# Patient Record
Sex: MALE | Race: WHITE | NOT HISPANIC OR LATINO | URBAN - METROPOLITAN AREA
[De-identification: names, ages, dates, MRNs, and addresses within clinical notes are randomized per-mention and may not be internally consistent; named-entity substitution may affect disease eponyms.]

---

## 2023-09-11 PROBLEM — F32.9 MAJOR DEPRESSIVE DISORDER, SINGLE EPISODE, UNSPECIFIED: Status: ACTIVE | Noted: 2023-09-11

## 2023-09-11 PROBLEM — Q22.1 PULMONIC STENOSIS, CONGENITAL (HHS-HCC): Status: ACTIVE | Noted: 2023-09-11

## 2023-09-13 ENCOUNTER — HOSPITAL ENCOUNTER (OUTPATIENT)
Dept: DATA CONVERSION | Facility: HOSPITAL | Age: 28
Discharge: PSYCHIATRIC OR SUBSTANCE USE FACILITY | End: 2023-09-13
Payer: COMMERCIAL

## 2023-09-13 DIAGNOSIS — Z20.822 CONTACT WITH AND (SUSPECTED) EXPOSURE TO COVID-19: ICD-10-CM

## 2023-09-13 DIAGNOSIS — F41.9 ANXIETY DISORDER, UNSPECIFIED: ICD-10-CM

## 2023-09-13 DIAGNOSIS — F32.A DEPRESSION, UNSPECIFIED: ICD-10-CM

## 2023-09-13 DIAGNOSIS — R45.851 SUICIDAL IDEATIONS: ICD-10-CM

## 2023-09-13 LAB
ALBUMIN SERPL-MCNC: 4.5 GM/DL (ref 3.5–5)
ALBUMIN/GLOB SERPL: 1.6 RATIO (ref 1.5–3)
ALP BLD-CCNC: 55 U/L (ref 35–125)
ALT SERPL-CCNC: 21 U/L (ref 5–40)
AMPHETAMINES UR QL SCN>1000 NG/ML: NEGATIVE
ANION GAP SERPL CALCULATED.3IONS-SCNC: 13 MMOL/L (ref 0–19)
AST SERPL-CCNC: 25 U/L (ref 5–40)
BACTERIA UR QL AUTO: NEGATIVE
BARBITURATES UR QL SCN>300 NG/ML: NEGATIVE
BASOPHILS # BLD AUTO: 0.04 K/UL (ref 0–0.22)
BASOPHILS NFR BLD AUTO: 0.4 % (ref 0–1)
BENZODIAZ UR QL SCN>300 NG/ML: NEGATIVE
BILIRUB SERPL-MCNC: 0.5 MG/DL (ref 0.1–1.2)
BILIRUB UR QL STRIP.AUTO: NEGATIVE
BUN SERPL-MCNC: 11 MG/DL (ref 8–25)
BUN/CREAT SERPL: 12.2 RATIO (ref 8–21)
BZE UR QL SCN>300 NG/ML: NEGATIVE
CALCIUM SERPL-MCNC: 9.2 MG/DL (ref 8.5–10.4)
CANNABINOIDS UR QL SCN>50 NG/ML: NEGATIVE
CHLORIDE SERPL-SCNC: 101 MMOL/L (ref 97–107)
CLARITY UR: CLEAR
CO2 SERPL-SCNC: 24 MMOL/L (ref 24–31)
COLOR UR: ABNORMAL
CREAT SERPL-MCNC: 0.9 MG/DL (ref 0.4–1.6)
DEPRECATED RDW RBC AUTO: 36.7 FL (ref 37–54)
DIFFERENTIAL METHOD BLD: ABNORMAL
DRUG SCREEN COMMENT UR-IMP: NORMAL
EOSINOPHIL # BLD AUTO: 0 K/UL (ref 0–0.45)
EOSINOPHIL NFR BLD: 0 % (ref 0–3)
ERYTHROCYTE [DISTWIDTH] IN BLOOD BY AUTOMATED COUNT: 11.3 % (ref 11.7–15)
ETHANOL SERPL-MCNC: <0.01 GM/DL (ref 0–0.01)
FENTANYL+NORFENTANYL UR QL SCN: NORMAL
GFR SERPL CREATININE-BSD FRML MDRD: 120 ML/MIN/1.73 M2
GLOBULIN SER-MCNC: 2.9 G/DL (ref 1.9–3.7)
GLUCOSE SERPL-MCNC: 122 MG/DL (ref 65–99)
GLUCOSE UR STRIP.AUTO-MCNC: NEGATIVE MG/DL
HCT VFR BLD AUTO: 43.6 % (ref 41–50)
HGB BLD-MCNC: 15.4 GM/DL (ref 13.5–16.5)
HGB UR QL STRIP.AUTO: 4 /HPF (ref 0–3)
HGB UR QL: NEGATIVE
HYALINE CASTS UR QL AUTO: 12 /LPF
IMM GRANULOCYTES # BLD AUTO: 0.02 K/UL (ref 0–0.1)
KETONES UR QL STRIP.AUTO: ABNORMAL
LEUKOCYTE ESTERASE UR QL STRIP.AUTO: NEGATIVE
LYMPHOCYTES # BLD AUTO: 1.27 K/UL (ref 1.2–3.2)
LYMPHOCYTES NFR BLD MANUAL: 14.3 % (ref 20–40)
MCH RBC QN AUTO: 31 PG (ref 26–34)
MCHC RBC AUTO-ENTMCNC: 35.3 % (ref 31–37)
MCV RBC AUTO: 87.7 FL (ref 80–100)
METHADONE UR QL SCN>300 NG/ML: NEGATIVE
MICROSCOPIC (UA): ABNORMAL
MONOCYTES # BLD AUTO: 0.86 K/UL (ref 0–0.8)
MONOCYTES NFR BLD MANUAL: 9.7 % (ref 0–8)
NEUTROPHILS # BLD AUTO: 6.7 K/UL
NEUTROPHILS # BLD AUTO: 6.7 K/UL (ref 1.8–7.7)
NEUTROPHILS.IMMATURE NFR BLD: 0.2 % (ref 0–1)
NEUTS SEG NFR BLD: 75.4 % (ref 50–70)
NITRITE UR QL STRIP.AUTO: NEGATIVE
NOTE (COV): NORMAL
NRBC BLD-RTO: 0 /100 WBC
OPIATES UR QL SCN>300 NG/ML: NEGATIVE
OXYCODONE UR QL: NEGATIVE
PCP UR QL SCN>25 NG/ML: NEGATIVE
PH UR STRIP.AUTO: 6 [PH] (ref 4.6–8)
PLATELET # BLD AUTO: 271 K/UL (ref 150–450)
PMV BLD AUTO: 9.4 CU (ref 7–12.6)
POTASSIUM SERPL-SCNC: 3.7 MMOL/L (ref 3.4–5.1)
PROT SERPL-MCNC: 7.4 G/DL (ref 5.9–7.9)
PROT UR STRIP.AUTO-MCNC: ABNORMAL MG/DL
RBC # BLD AUTO: 4.97 M/UL (ref 4.5–5.5)
SARS-COV-2 RNA RESP QL NAA+PROBE: NEGATIVE
SODIUM SERPL-SCNC: 138 MMOL/L (ref 133–145)
SP GR UR STRIP.AUTO: 1.02 (ref 1–1.03)
SPECIMEN SOURCE (COV): NORMAL
SQUAMOUS UR QL AUTO: ABNORMAL /HPF
URINE CULTURE: ABNORMAL
UROBILINOGEN UR QL STRIP.AUTO: NORMAL MG/DL (ref 0–1)
WBC # BLD AUTO: 8.9 K/UL (ref 4.5–11)
WBC #/AREA URNS AUTO: 8 /HPF (ref 0–3)

## 2024-08-01 ASSESSMENT — ENCOUNTER SYMPTOMS
APPETITE CHANGE: 0
HEADACHES: 0
SHORTNESS OF BREATH: 0
COUGH: 0
ABDOMINAL PAIN: 0
VOMITING: 0
CHILLS: 0
DIARRHEA: 0
FEVER: 0
NAUSEA: 0

## 2024-08-01 NOTE — PROGRESS NOTES
[facility]: [facility]  PHYSICAL EXAM      Jose Retana is a 29 y.o. male that is presenting today for cpe.    Assessment/Plan   Diagnoses and all orders for this visit:  Annual physical exam  -     CBC and Auto Differential; Future  -     Comprehensive Metabolic Panel; Future  -     Lipid Panel; Future  -     Hemoglobin A1C; Future  -     Vitamin D 25-Hydroxy,Total (for eval of Vitamin D levels); Future  -     TSH with reflex to Free T4 if abnormal; Future  Pulmonic stenosis, congenital ([facility]-HCC)  Major depressive disorder with single episode, remission status unspecified  Mixed hyperlipidemia  -     Lipid Panel; Future  Hyperglycemia  -     Hemoglobin A1C; Future  Vitamin D deficiency  -     Vitamin D 25-Hydroxy,Total (for eval of Vitamin D levels); Future  Other orders  -     Follow Up In Primary Care - Health Maintenance; Future    TV repair November 2021.  Sees cardiology at [facility].  Seems to be doing well.  Gets the echocardiogram updated yearly.  Was struggling with some depression initially, has been stable on the Prozac.  Main issue is that the weight has been going up.  Could be in part due to the SSRI.  Talked about diet lifestyle efforts.  Talked about getting a pedometer or a smart watch and trying to get his steps in every day.  His dietary habits have not really changed.  Update the laboratory studies.    Subjective   HPI  The patient is here for physical exam and follow up.  We reviewed the medical, family and social history as outlined below.  We reviewed any currently prescribed medications.  We reviewed the screening and prevention schedule in detail.    The patient denies chest pain and shortness of breath.  No exertion-provoked or anginal-type symptoms are reported.    Review of Systems   Constitutional:  Negative for appetite change, chills and fever.   Respiratory:  Negative for cough and shortness of breath.    Cardiovascular:  Negative for chest pain.  "  Gastrointestinal:  Negative for abdominal pain, diarrhea, nausea and vomiting.   Neurological:  Negative for headaches.   All other systems reviewed and are negative.     Objective   Vitals:    08/05/24 1125   BP: 116/74   Pulse: 84   Temp: 36.5 °C (97.7 °F)   SpO2: 97%     Body mass index is 28.35 kg/m².  Physical Exam  Vitals reviewed.   Constitutional:       General: He is not in acute distress.     Appearance: He is not toxic-appearing.   HENT:      Head: Normocephalic and atraumatic.      Mouth/Throat:      Mouth: Mucous membranes are moist.   Eyes:      Pupils: Pupils are equal, round, and reactive to light.   Cardiovascular:      Rate and Rhythm: Normal rate and regular rhythm.      Heart sounds: No murmur heard.  Pulmonary:      Breath sounds: Normal breath sounds. No wheezing, rhonchi or rales.   Abdominal:      General: There is no distension.      Palpations: Abdomen is soft.      Hernia: There is no hernia in the left inguinal area or right inguinal area.   Genitourinary:     Penis: Normal and circumcised.       Testes: Normal.         Right: Mass not present.         Left: Mass not present.   Musculoskeletal:      Right lower leg: No edema.      Left lower leg: No edema.   Neurological:      General: No focal deficit present.      Mental Status: He is alert and oriented to person, place, and time.       Diagnostic Results   Lab Results   Component Value Date    GLUCOSE 122 (H) 09/13/2023    CALCIUM 9.2 09/13/2023     09/13/2023    K 3.7 09/13/2023    CO2 24 09/13/2023     09/13/2023    BUN 11 09/13/2023    CREATININE 0.9 09/13/2023     Lab Results   Component Value Date    ALT 21 09/13/2023    AST 25 09/13/2023    ALKPHOS 55 09/13/2023    BILITOT 0.5 09/13/2023     Lab Results   Component Value Date    WBC 8.9 09/13/2023    HGB 15.4 09/13/2023    HCT 43.6 09/13/2023    MCV 87.7 09/13/2023     09/13/2023     No results found for: \"CHOL\"  No results found for: \"HDL\"  No results found " "for: \"LDLCALC\"  No results found for: \"TRIG\"  No components found for: \"CHOLHDL\"  No results found for: \"HGBA1C\"  Other labs not included in the list above were reviewed either before or during this encounter.    History   History reviewed. No pertinent past medical history.  History reviewed. No pertinent surgical history.  Family History   Problem Relation Name Age of Onset    Cancer Father      Testicular cancer Father          40's    Asthma Mother's Brother      Arthritis Maternal Grandmother      Breast cancer Maternal Grandmother      Cancer Maternal Grandmother      Cancer Maternal Grandfather      Lung cancer Maternal Grandfather      Testicular cancer Other Uncles         40's     Social History     Socioeconomic History    Marital status: Single     Spouse name: Not on file    Number of children: Not on file    Years of education: Not on file    Highest education level: Not on file   Occupational History    Not on file   Tobacco Use    Smoking status: Never    Smokeless tobacco: Never   Substance and Sexual Activity    Alcohol use: Never    Drug use: Never    Sexual activity: Not on file   Other Topics Concern    Not on file   Social History Narrative    Not on file     Social Determinants of Health     Financial Resource Strain: Not on file   Food Insecurity: Not on file   Transportation Needs: Not on file   Physical Activity: Not on file   Stress: Not on file   Social Connections: Not on file   Intimate Partner Violence: Not on file   Housing Stability: Not on file     Allergies   Allergen Reactions    Aripiprazole Unknown     Sedating effects; severe mood change    Sutures Unknown     Vicryl     Current Outpatient Medications on File Prior to Visit   Medication Sig Dispense Refill    FLUoxetine (PROzac) 20 mg capsule Take 1 capsule (20 mg) by mouth once daily.      multivit-minerals/folic acid (ADULT MULTIVITAMIN GUMMIES ORAL) Take by mouth once daily. As directed      [DISCONTINUED] aspirin 81 mg " capsule Take 1 capsule by mouth once daily. For 30 days       No current facility-administered medications on file prior to visit.     Immunization History   Administered Date(s) Administered    DTaP vaccine, pediatric  (INFANRIX) 1996, 1996, 1996, 06/02/1997, 03/01/2001    Flu vaccine (IIV4), preservative free *Check age/dose* 10/01/2015, 09/30/2016    HPV, Quadrivalent 12/27/2012, 03/25/2013, 07/08/2013    Hepatitis B vaccine, 19 yrs and under (RECOMBIVAX, ENGERIX) 1996, 1996, 1996    HiB PRP-T conjugate vaccine (HIBERIX, ACTHIB) 1996, 1996, 1996, 03/04/1997    Influenza, injectable, quadrivalent 10/02/2019, 09/01/2020    Influenza, live, intranasal, quadrivalent 10/27/2008    Influenza, seasonal, injectable 11/11/2004, 12/20/2004, 10/06/2006, 10/29/2007, 10/16/2011    Influenza, seasonal, injectable, preservative free 10/07/2012    MMR vaccine, subcutaneous (MMR II) 03/04/1997, 03/01/2001    Meningococcal ACWY vaccine (MENVEO) 10/29/2007, 12/27/2012    Pfizer COVID-19 vaccine, Fall 2023, 12 years and older, (30mcg/0.3mL) 10/16/2023    Pfizer COVID-19 vaccine, bivalent, age 12 years and older (30 mcg/0.3 mL) 12/06/2022    Pfizer Purple Cap SARS-CoV-2 03/06/2021, 03/27/2021, 10/16/2021    Polio, Unspecified 1996, 1996, 1996    Poliovirus vaccine, subcutaneous (IPOL) 03/01/2001    Tdap vaccine, age 7 year and older (BOOSTRIX, ADACEL) 10/29/2007, 06/13/2023    Varicella vaccine, subcutaneous (VARIVAX) 08/12/1997, 10/27/2008     Patient's medical history was reviewed and updated either before or during this encounter.       Atul Jasso MD

## 2024-08-05 ENCOUNTER — OFFICE VISIT (OUTPATIENT)
Dept: PRIMARY CARE | Facility: CLINIC | Age: 29
End: 2024-08-05
Payer: COMMERCIAL

## 2024-08-05 VITALS
SYSTOLIC BLOOD PRESSURE: 116 MMHG | WEIGHT: 192 LBS | TEMPERATURE: 97.7 F | HEART RATE: 84 BPM | BODY MASS INDEX: 28.44 KG/M2 | OXYGEN SATURATION: 97 % | DIASTOLIC BLOOD PRESSURE: 74 MMHG | HEIGHT: 69 IN

## 2024-08-05 DIAGNOSIS — E78.2 MIXED HYPERLIPIDEMIA: ICD-10-CM

## 2024-08-05 DIAGNOSIS — R73.9 HYPERGLYCEMIA: ICD-10-CM

## 2024-08-05 DIAGNOSIS — F32.9 MAJOR DEPRESSIVE DISORDER WITH SINGLE EPISODE, REMISSION STATUS UNSPECIFIED: ICD-10-CM

## 2024-08-05 DIAGNOSIS — Q22.1 PULMONIC STENOSIS, CONGENITAL (HHS-HCC): ICD-10-CM

## 2024-08-05 DIAGNOSIS — E55.9 VITAMIN D DEFICIENCY: ICD-10-CM

## 2024-08-05 DIAGNOSIS — Z00.00 ANNUAL PHYSICAL EXAM: Primary | ICD-10-CM

## 2024-08-05 PROCEDURE — 99385 PREV VISIT NEW AGE 18-39: CPT | Performed by: INTERNAL MEDICINE

## 2024-08-05 PROCEDURE — 1036F TOBACCO NON-USER: CPT | Performed by: INTERNAL MEDICINE

## 2024-08-05 PROCEDURE — 3008F BODY MASS INDEX DOCD: CPT | Performed by: INTERNAL MEDICINE

## 2024-08-05 RX ORDER — FLUOXETINE HYDROCHLORIDE 20 MG/1
20 CAPSULE ORAL
COMMUNITY
Start: 2024-07-23

## 2024-08-05 ASSESSMENT — PATIENT HEALTH QUESTIONNAIRE - PHQ9
SUM OF ALL RESPONSES TO PHQ9 QUESTIONS 1 AND 2: 0
1. LITTLE INTEREST OR PLEASURE IN DOING THINGS: NOT AT ALL
2. FEELING DOWN, DEPRESSED OR HOPELESS: NOT AT ALL

## 2024-08-05 ASSESSMENT — LIFESTYLE VARIABLES
HOW OFTEN DO YOU HAVE SIX OR MORE DRINKS ON ONE OCCASION: NEVER
HOW OFTEN DURING THE LAST YEAR HAVE YOU FAILED TO DO WHAT WAS NORMALLY EXPECTED FROM YOU BECAUSE OF DRINKING: NEVER
HOW OFTEN DURING THE LAST YEAR HAVE YOU HAD A FEELING OF GUILT OR REMORSE AFTER DRINKING: NEVER
AUDIT-C TOTAL SCORE: 0
HAVE YOU OR SOMEONE ELSE BEEN INJURED AS A RESULT OF YOUR DRINKING: NO
HOW OFTEN DURING THE LAST YEAR HAVE YOU FOUND THAT YOU WERE NOT ABLE TO STOP DRINKING ONCE YOU HAD STARTED: NEVER
HOW OFTEN DURING THE LAST YEAR HAVE YOU NEEDED AN ALCOHOLIC DRINK FIRST THING IN THE MORNING TO GET YOURSELF GOING AFTER A NIGHT OF HEAVY DRINKING: NEVER
HAS A RELATIVE, FRIEND, DOCTOR, OR ANOTHER HEALTH PROFESSIONAL EXPRESSED CONCERN ABOUT YOUR DRINKING OR SUGGESTED YOU CUT DOWN: NO
HOW MANY STANDARD DRINKS CONTAINING ALCOHOL DO YOU HAVE ON A TYPICAL DAY: PATIENT DOES NOT DRINK
SKIP TO QUESTIONS 9-10: 1
AUDIT TOTAL SCORE: 0
HOW OFTEN DURING THE LAST YEAR HAVE YOU BEEN UNABLE TO REMEMBER WHAT HAPPENED THE NIGHT BEFORE BECAUSE YOU HAD BEEN DRINKING: NEVER
HOW OFTEN DO YOU HAVE A DRINK CONTAINING ALCOHOL: NEVER

## 2024-08-05 ASSESSMENT — ENCOUNTER SYMPTOMS
OCCASIONAL FEELINGS OF UNSTEADINESS: 0
DEPRESSION: 0
LOSS OF SENSATION IN FEET: 0

## 2024-08-05 ASSESSMENT — PAIN SCALES - GENERAL: PAINLEVEL: 0-NO PAIN

## 2025-03-29 ENCOUNTER — OFFICE VISIT (OUTPATIENT)
Dept: URGENT CARE | Age: 30
End: 2025-03-29
Payer: COMMERCIAL

## 2025-03-29 VITALS
RESPIRATION RATE: 19 BRPM | BODY MASS INDEX: 29.53 KG/M2 | OXYGEN SATURATION: 98 % | DIASTOLIC BLOOD PRESSURE: 76 MMHG | SYSTOLIC BLOOD PRESSURE: 116 MMHG | TEMPERATURE: 97.6 F | HEART RATE: 86 BPM | WEIGHT: 200 LBS

## 2025-03-29 DIAGNOSIS — H61.23 BILATERAL IMPACTED CERUMEN: Primary | ICD-10-CM

## 2025-03-29 NOTE — PROGRESS NOTES
Subjective   Patient ID: Jose Retana is a 30 y.o. male. They present today with a chief complaint of Earache (Patient here with L earache x 2 days).    History of Present Illness  See mdm      History provided by:  Patient      Past Medical History  Allergies as of 03/29/2025 - Reviewed 03/29/2025   Allergen Reaction Noted    Aripiprazole Unknown 07/24/2024    Sutures Unknown 09/11/2023       (Not in a hospital admission)       No past medical history on file.    No past surgical history on file.     reports that he has never smoked. He has never used smokeless tobacco. He reports that he does not drink alcohol and does not use drugs.    Review of Systems  Review of Systems   HENT:  Positive for ear pain (left ear fullness).    All other systems reviewed and are negative.                                 Objective    Vitals:    03/29/25 1911   BP: 116/76   BP Location: Right arm   Patient Position: Sitting   BP Cuff Size: Adult   Pulse: 86   Resp: 19   Temp: 36.4 °C (97.6 °F)   SpO2: 98%   Weight: 90.7 kg (200 lb)     No LMP for male patient.    Physical Exam  Vitals and nursing note reviewed.   HENT:      Right Ear: There is impacted cerumen.      Left Ear: There is impacted cerumen.         Ear Cerumen Removal    Date/Time: 3/29/2025 7:37 PM    Performed by: Crystal L Severino, APRN-CNP  Authorized by: Crystal L Severino, APRN-CNP    Consent:     Consent obtained:  Verbal    Consent given by:  Patient    Risks, benefits, and alternatives were discussed: yes      Risks discussed:  Bleeding, infection, incomplete removal, dizziness, TM perforation and pain    Alternatives discussed:  No treatment and delayed treatment  Universal protocol:     Patient identity confirmed:  Verbally with patient  Procedure details:     Location:  L ear and R ear    Procedure type: irrigation      Procedure outcomes: cerumen removed    Post-procedure details:     Inspection:  Some cerumen remaining    Hearing quality:  Improved     Procedure completion:  Tolerated well, no immediate complications      Point of Care Test & Imaging Results from this visit  No results found for this visit on 03/29/25.   Imaging  No results found.    Cardiology, Vascular, and Other Imaging  No other imaging results found for the past 2 days      Diagnostic study results (if any) were reviewed by Crystal L Severino, APRN-CNP.    Assessment/Plan   Allergies, medications, history, and pertinent labs/EKGs/Imaging reviewed by Crystal L Severino, APRN-CNP.     Medical Decision Making  30 yr old male presents with c/o difficulty hearing out of left ear x2 days.  He reports he has had issues with cerumen impaction in the past and had to have his ears flushed.  He denies any pain, fever/chills, headache, ST.  Ear flushed as above.  At time of discharge patient was clinically well-appearing and HDS for outpatient management. The patient and/or family was educated regarding diagnosis, supportive care, OTC and Rx medications. The patient and/or family was given the opportunity to ask questions prior to discharge.  They verbalized understanding of my discussion of the plans for treatment, expected course, indications to return to  or seek further evaluation in ED, and the need for timely follow up as directed.   They were provided with a work/school excuse if requested.      Orders and Diagnoses  Diagnoses and all orders for this visit:  Bilateral impacted cerumen  -     Ear cerumen removal; Future  Debrox as needed    Medical Admin Record      Patient disposition: Home    Electronically signed by Crystal L Severino, APRN-CNP  7:36 PM

## 2025-08-07 ASSESSMENT — ENCOUNTER SYMPTOMS
VOMITING: 0
ABDOMINAL PAIN: 0
COUGH: 0
APPETITE CHANGE: 0
NAUSEA: 0
DIARRHEA: 0
SHORTNESS OF BREATH: 0
CHILLS: 0
HEADACHES: 0
FEVER: 0

## 2025-08-11 ENCOUNTER — OFFICE VISIT (OUTPATIENT)
Dept: PRIMARY CARE | Facility: CLINIC | Age: 30
End: 2025-08-11
Payer: COMMERCIAL

## 2025-08-11 VITALS
OXYGEN SATURATION: 96 % | BODY MASS INDEX: 31.4 KG/M2 | HEART RATE: 96 BPM | DIASTOLIC BLOOD PRESSURE: 70 MMHG | WEIGHT: 212 LBS | SYSTOLIC BLOOD PRESSURE: 124 MMHG | HEIGHT: 69 IN

## 2025-08-11 DIAGNOSIS — Z00.00 ANNUAL PHYSICAL EXAM: Primary | ICD-10-CM

## 2025-08-11 DIAGNOSIS — R73.9 HYPERGLYCEMIA: ICD-10-CM

## 2025-08-11 DIAGNOSIS — Q22.1 PULMONIC STENOSIS, CONGENITAL (HHS-HCC): ICD-10-CM

## 2025-08-11 DIAGNOSIS — E55.9 VITAMIN D DEFICIENCY: ICD-10-CM

## 2025-08-11 DIAGNOSIS — E78.2 MIXED HYPERLIPIDEMIA: ICD-10-CM

## 2025-08-11 PROCEDURE — 3008F BODY MASS INDEX DOCD: CPT | Performed by: INTERNAL MEDICINE

## 2025-08-11 PROCEDURE — 99395 PREV VISIT EST AGE 18-39: CPT | Performed by: INTERNAL MEDICINE

## 2025-08-11 PROCEDURE — 99385 PREV VISIT NEW AGE 18-39: CPT | Performed by: INTERNAL MEDICINE

## 2025-08-11 PROCEDURE — 1036F TOBACCO NON-USER: CPT | Performed by: INTERNAL MEDICINE

## 2025-08-11 RX ORDER — FLUOXETINE 10 MG/1
10 CAPSULE ORAL DAILY
Status: SHIPPED
Start: 2025-08-11

## 2025-08-11 ASSESSMENT — PAIN SCALES - GENERAL: PAINLEVEL_OUTOF10: 0-NO PAIN

## 2025-08-11 ASSESSMENT — PATIENT HEALTH QUESTIONNAIRE - PHQ9
2. FEELING DOWN, DEPRESSED OR HOPELESS: NOT AT ALL
1. LITTLE INTEREST OR PLEASURE IN DOING THINGS: NOT AT ALL
SUM OF ALL RESPONSES TO PHQ9 QUESTIONS 1 AND 2: 0

## 2025-08-30 LAB
25(OH)D3+25(OH)D2 SERPL-MCNC: 28 NG/ML (ref 30–100)
ALBUMIN SERPL-MCNC: 4.4 G/DL (ref 3.6–5.1)
ALP SERPL-CCNC: 61 U/L (ref 36–130)
ALT SERPL-CCNC: 15 U/L (ref 9–46)
ANION GAP SERPL CALCULATED.4IONS-SCNC: 10 MMOL/L (CALC) (ref 7–17)
AST SERPL-CCNC: 13 U/L (ref 10–40)
BASOPHILS # BLD AUTO: 62 CELLS/UL (ref 0–200)
BASOPHILS NFR BLD AUTO: 1 %
BILIRUB SERPL-MCNC: 0.4 MG/DL (ref 0.2–1.2)
BUN SERPL-MCNC: 13 MG/DL (ref 7–25)
CALCIUM SERPL-MCNC: 9.4 MG/DL (ref 8.6–10.3)
CHLORIDE SERPL-SCNC: 103 MMOL/L (ref 98–110)
CHOLEST SERPL-MCNC: 164 MG/DL
CHOLEST/HDLC SERPL: 5.1 (CALC)
CO2 SERPL-SCNC: 26 MMOL/L (ref 20–32)
CREAT SERPL-MCNC: 0.83 MG/DL (ref 0.6–1.24)
EGFRCR SERPLBLD CKD-EPI 2021: 122 ML/MIN/1.73M2
EOSINOPHIL # BLD AUTO: 149 CELLS/UL (ref 15–500)
EOSINOPHIL NFR BLD AUTO: 2.4 %
ERYTHROCYTE [DISTWIDTH] IN BLOOD BY AUTOMATED COUNT: 12.3 % (ref 11–15)
EST. AVERAGE GLUCOSE BLD GHB EST-MCNC: 108 MG/DL
EST. AVERAGE GLUCOSE BLD GHB EST-SCNC: 6 MMOL/L
GLUCOSE SERPL-MCNC: 90 MG/DL (ref 65–99)
HBA1C MFR BLD: 5.4 %
HCT VFR BLD AUTO: 46.1 % (ref 38.5–50)
HDLC SERPL-MCNC: 32 MG/DL
HGB BLD-MCNC: 15.6 G/DL (ref 13.2–17.1)
LDLC SERPL CALC-MCNC: 98 MG/DL (CALC)
LYMPHOCYTES # BLD AUTO: 1947 CELLS/UL (ref 850–3900)
LYMPHOCYTES NFR BLD AUTO: 31.4 %
MCH RBC QN AUTO: 30.1 PG (ref 27–33)
MCHC RBC AUTO-ENTMCNC: 33.8 G/DL (ref 32–36)
MCV RBC AUTO: 89 FL (ref 80–100)
MONOCYTES # BLD AUTO: 608 CELLS/UL (ref 200–950)
MONOCYTES NFR BLD AUTO: 9.8 %
NEUTROPHILS # BLD AUTO: 3435 CELLS/UL (ref 1500–7800)
NEUTROPHILS NFR BLD AUTO: 55.4 %
NONHDLC SERPL-MCNC: 132 MG/DL (CALC)
PLATELET # BLD AUTO: 266 THOUSAND/UL (ref 140–400)
PMV BLD REES-ECKER: 9.5 FL (ref 7.5–12.5)
POTASSIUM SERPL-SCNC: 4.3 MMOL/L (ref 3.5–5.3)
PROT SERPL-MCNC: 7 G/DL (ref 6.1–8.1)
RBC # BLD AUTO: 5.18 MILLION/UL (ref 4.2–5.8)
SODIUM SERPL-SCNC: 139 MMOL/L (ref 135–146)
TRIGL SERPL-MCNC: 219 MG/DL
TSH SERPL-ACNC: 0.68 MIU/L (ref 0.4–4.5)
WBC # BLD AUTO: 6.2 THOUSAND/UL (ref 3.8–10.8)